# Patient Record
Sex: MALE | Race: OTHER | HISPANIC OR LATINO | URBAN - METROPOLITAN AREA
[De-identification: names, ages, dates, MRNs, and addresses within clinical notes are randomized per-mention and may not be internally consistent; named-entity substitution may affect disease eponyms.]

---

## 2021-10-01 ENCOUNTER — EMERGENCY (EMERGENCY)
Facility: HOSPITAL | Age: 21
LOS: 1 days | Discharge: HOME | End: 2021-10-01
Attending: EMERGENCY MEDICINE
Payer: SELF-PAY

## 2021-10-01 VITALS
TEMPERATURE: 99 F | WEIGHT: 139.99 LBS | HEIGHT: 65 IN | OXYGEN SATURATION: 98 % | HEART RATE: 105 BPM | RESPIRATION RATE: 15 BRPM | SYSTOLIC BLOOD PRESSURE: 153 MMHG | DIASTOLIC BLOOD PRESSURE: 86 MMHG

## 2021-10-01 LAB
APPEARANCE UR: CLEAR — SIGNIFICANT CHANGE UP
BACTERIA # UR AUTO: ABNORMAL /HPF
BILIRUB UR-MCNC: NEGATIVE — SIGNIFICANT CHANGE UP
COLOR SPEC: YELLOW — SIGNIFICANT CHANGE UP
DIFF PNL FLD: ABNORMAL
EPI CELLS # UR: ABNORMAL /HPF
GLUCOSE UR QL: NEGATIVE — SIGNIFICANT CHANGE UP
KETONES UR-MCNC: NEGATIVE — SIGNIFICANT CHANGE UP
LEUKOCYTE ESTERASE UR-ACNC: NEGATIVE — SIGNIFICANT CHANGE UP
NITRITE UR-MCNC: NEGATIVE — SIGNIFICANT CHANGE UP
PH UR: 5 — SIGNIFICANT CHANGE UP (ref 5–8)
PROT UR-MCNC: 15
RBC CASTS # UR COMP ASSIST: ABNORMAL /HPF (ref 0–2)
SP GR SPEC: 1.01 — SIGNIFICANT CHANGE UP (ref 1.01–1.02)
UROBILINOGEN FLD QL: NEGATIVE — SIGNIFICANT CHANGE UP
WBC UR QL: SIGNIFICANT CHANGE UP /HPF (ref 0–5)

## 2021-10-01 PROCEDURE — 96372 THER/PROPH/DIAG INJ SC/IM: CPT

## 2021-10-01 PROCEDURE — 81001 URINALYSIS AUTO W/SCOPE: CPT

## 2021-10-01 PROCEDURE — 87591 N.GONORRHOEAE DNA AMP PROB: CPT

## 2021-10-01 PROCEDURE — 87086 URINE CULTURE/COLONY COUNT: CPT

## 2021-10-01 PROCEDURE — 99283 EMERGENCY DEPT VISIT LOW MDM: CPT | Mod: 25

## 2021-10-01 PROCEDURE — 87491 CHLMYD TRACH DNA AMP PROBE: CPT

## 2021-10-01 PROCEDURE — 99284 EMERGENCY DEPT VISIT MOD MDM: CPT

## 2021-10-01 RX ORDER — AZITHROMYCIN 500 MG/1
1000 TABLET, FILM COATED ORAL ONCE
Refills: 0 | Status: COMPLETED | OUTPATIENT
Start: 2021-10-01 | End: 2021-10-01

## 2021-10-01 RX ORDER — CEFTRIAXONE 500 MG/1
250 INJECTION, POWDER, FOR SOLUTION INTRAMUSCULAR; INTRAVENOUS ONCE
Refills: 0 | Status: COMPLETED | OUTPATIENT
Start: 2021-10-01 | End: 2021-10-01

## 2021-10-01 RX ADMIN — CEFTRIAXONE 250 MILLIGRAM(S): 500 INJECTION, POWDER, FOR SOLUTION INTRAMUSCULAR; INTRAVENOUS at 23:39

## 2021-10-01 RX ADMIN — AZITHROMYCIN 1000 MILLIGRAM(S): 500 TABLET, FILM COATED ORAL at 23:39

## 2021-10-01 NOTE — ED PROVIDER NOTE - PROGRESS NOTE DETAILS
ua with small bacteria. given sti treatment. will dc with cipro given bacteuria and symptoms. f/u PMD. return precautions discussed.

## 2021-10-01 NOTE — ED PROVIDER NOTE - NSFOLLOWUPINSTRUCTIONS_ED_ALL_ED_FT
Log Out.      [employer] CareNotes®     :  [facility]  	                       DYSURIA - AfterCare(R) Instructions(ER/ED)           Disuria    LO QUE NECESITA SABER:    La disuria es la dificultad al orinar, o el dolor, ardor o molestias al orinar. La disuria por lo general es un síntoma de algún otro problema.    INSTRUCCIONES SOBRE EL [facility]ITAL[facility]:    Regrese a la hernandez de emergencias si:  •Usted tiene dolor intenso en la espalda, en un costado o en el abdomen.      •Usted tiene fiebre y escalofríos con temblor.      •Usted vomita varias veces seguidas.      Comuníquese con grigsby médico si:  •Emily síntomas no desaparecen, aún después del tratamiento.      •Usted tiene preguntas o inquietudes acerca de grigsby condición o cuidado.      Medicamentos:  •Los medicamentospara ayudar a tratar andi infección bacteriana o para disminuir los espasmos musculares.      •Pine emily medicamentos ryan se le haya indicado.Consulte con grigsby médico si usted jason que grigsby medicamento no le está ayudando o si presenta efectos secundarios. Infórmele si es alérgico a algún medicamento. Mantenga andi lista actualizada de los medicamentos, las vitaminas y los productos herbales que bobby. Incluya los siguientes datos de los medicamentos: cantidad, frecuencia y motivo de administración. Traiga con usted la lista o los envases de las píldoras a emily citas de seguimiento. Lleve la lista de los medicamentos con usted en fabien de andi emergencia.      Acuda a emily consultas de control con grigsby médico según le indicaron.Grigsby médico podría referirlo a un urólogo o nefrólogo para que le realicen exámenes adicionales. Anote emily preguntas para que se acuerde de hacerlas brigido emily visitas.    Controle grigsby disuria:  •Ingiera más líquidos.Los líquidos ayudan a desechar las bacterias que estén provocando andi infección. Pregunte a grigsby médico sobre la cantidad de líquido que necesita spring todos los renee y cuáles le recomienda.      •Pine tye de asiento ryan se le indique.Llene andi nimco de baño con 4 a 6 pulgadas de agua cálida. También puede usar un recipiente para baño de asiento que quepa en el inodoro. Siéntese en el baño de asiento brigido 20 minutos. Jaylon esto 2 a 3 veces al día o ryan se lo hayan indicado. El agua cálida va a ayudar a reducir el dolor y la inflamación.          © Copyright [employer] 2021           back to top                          © Copyright [employer] 2021

## 2021-10-01 NOTE — ED PROVIDER NOTE - OBJECTIVE STATEMENT
21 year old male denies PMH coming in with dysuria and foul odor of urine for the past day. never had this before. denies testicular pain, penile discharge, abd pains, back pains, flank pains, fevers, chills, sweats, N/V/D/C, lesions on penis. sexually active but no new partners but didn't use condom last time he had sex.

## 2021-10-01 NOTE — ED PROVIDER NOTE - PATIENT PORTAL LINK FT
You can access the FollowMyHealth Patient Portal offered by [facility] by registering at the following website: [email or web address]. By joining [email or web address]’s FollowMyHealth portal, you will also be able to view your health information using other applications (apps) compatible with our system.

## 2021-10-02 VITALS
HEART RATE: 94 BPM | DIASTOLIC BLOOD PRESSURE: 73 MMHG | OXYGEN SATURATION: 98 % | TEMPERATURE: 98 F | SYSTOLIC BLOOD PRESSURE: 149 MMHG | RESPIRATION RATE: 16 BRPM

## 2021-10-02 RX ORDER — CIPROFLOXACIN LACTATE 400MG/40ML
1 VIAL (ML) INTRAVENOUS
Qty: 6 | Refills: 0
Start: 2021-10-02 | End: 2021-10-04

## 2021-10-03 LAB
CULTURE RESULTS: NO GROWTH — SIGNIFICANT CHANGE UP
SPECIMEN SOURCE: SIGNIFICANT CHANGE UP

## 2021-10-04 LAB
C TRACH RRNA SPEC QL NAA+PROBE: SIGNIFICANT CHANGE UP
N GONORRHOEA RRNA SPEC QL NAA+PROBE: SIGNIFICANT CHANGE UP
SPECIMEN SOURCE: SIGNIFICANT CHANGE UP

## 2024-02-20 ENCOUNTER — EMERGENCY (EMERGENCY)
Facility: HOSPITAL | Age: 24
LOS: 1 days | Discharge: HOME | End: 2024-02-20
Attending: STUDENT IN AN ORGANIZED HEALTH CARE EDUCATION/TRAINING PROGRAM
Payer: SELF-PAY

## 2024-02-20 VITALS
HEART RATE: 85 BPM | DIASTOLIC BLOOD PRESSURE: 77 MMHG | OXYGEN SATURATION: 100 % | RESPIRATION RATE: 17 BRPM | SYSTOLIC BLOOD PRESSURE: 131 MMHG

## 2024-02-20 VITALS
OXYGEN SATURATION: 100 % | RESPIRATION RATE: 18 BRPM | TEMPERATURE: 98 F | WEIGHT: 154.32 LBS | SYSTOLIC BLOOD PRESSURE: 176 MMHG | HEART RATE: 104 BPM | DIASTOLIC BLOOD PRESSURE: 68 MMHG

## 2024-02-20 LAB
FLUAV AG NPH QL: SIGNIFICANT CHANGE UP
FLUBV AG NPH QL: SIGNIFICANT CHANGE UP
SARS-COV-2 RNA SPEC QL NAA+PROBE: SIGNIFICANT CHANGE UP

## 2024-02-20 PROCEDURE — 99284 EMERGENCY DEPT VISIT MOD MDM: CPT

## 2024-02-20 PROCEDURE — 99285 EMERGENCY DEPT VISIT HI MDM: CPT | Mod: 25

## 2024-02-20 PROCEDURE — 71046 X-RAY EXAM CHEST 2 VIEWS: CPT | Mod: 26

## 2024-02-20 PROCEDURE — 99053 MED SERV 10PM-8AM 24 HR FAC: CPT

## 2024-02-20 PROCEDURE — 87637 SARSCOV2&INF A&B&RSV AMP PRB: CPT

## 2024-02-20 PROCEDURE — 93005 ELECTROCARDIOGRAM TRACING: CPT

## 2024-02-20 PROCEDURE — 71046 X-RAY EXAM CHEST 2 VIEWS: CPT

## 2024-02-20 RX ORDER — IBUPROFEN 200 MG
600 TABLET ORAL ONCE
Refills: 0 | Status: COMPLETED | OUTPATIENT
Start: 2024-02-20 | End: 2024-02-20

## 2024-02-20 RX ADMIN — Medication 600 MILLIGRAM(S): at 08:10

## 2024-02-20 NOTE — ED PROVIDER NOTE - NSFOLLOWUPINSTRUCTIONS_ED_ALL_ED_FT
Le atendieron en urgencias por dolor en el pecho.    Jaylon un seguimiento con grigsby médico de atención primaria dentro de las próximas 24 a 48 horas.    [place] ibuprofeno y Tylenol según lo prescrito en los frascos para controlar el dolor.    Si tiene algún síntoma que empeora, dolor de christi intenso, dolor en el pecho, dificultad para respirar, regrese al departamento de emergencias.    Translation via Google Translate. Cannot guarantee accuracy.     You were seen in the emergency department for chest pain.     Please follow-up with your primary care doctor within the next 24-48 hours.    Please take Ibuprofen and Tylenol as prescribed on the bottles for pain control.     If you have any worsening symptoms, severe headache, chest pain, trouble breathing, please return to the emergency department.

## 2024-02-20 NOTE — ED ADULT NURSE NOTE - NSFALLUNIVINTERV_ED_ALL_ED
Bed/Stretcher in lowest position, wheels locked, appropriate side rails in place/Call bell, personal items and telephone in reach/Instruct patient to call for assistance before getting out of bed/chair/stretcher/Non-slip footwear applied when patient is off stretcher/[place] to call system/Physically safe environment - no spills, clutter or unnecessary equipment/Purposeful proactive rounding/Room/bathroom lighting operational, light cord in reach

## 2024-02-20 NOTE — ED PROVIDER NOTE - OBJECTIVE STATEMENT
24-year-old male, no significant PMH, presenting with 2 weeks of chest pain.  Patient reports that the chest pain is worse when he exerts himself.  Denies any headache, trouble breathing or fever.  Reports he has had on and off nosebleeds.

## 2024-02-20 NOTE — ED PROVIDER NOTE - PATIENT PORTAL LINK FT
You can access the FollowMyHealth Patient Portal offered by [facility] by registering at the following website: [email or web address]. By joining [employer]’s FollowMyHealth portal, you will also be able to view your health information using other applications (apps) compatible with our system.

## 2024-02-20 NOTE — ED PROVIDER NOTE - CLINICAL SUMMARY MEDICAL DECISION MAKING FREE TEXT BOX
24-year-old male presenting with chest pain.  Pain not exertional or pleuritic but worse when patient moves.  Low concern for ACS.  Symptoms likely secondary to MSK pain versus costochondritis.  Will obtain EKG and chest x-ray to assess for pneumonia.  Patient requesting viral swab. 24-year-old male presenting with chest pain.  Pain not exertional or pleuritic but worse when patient moves.  Low concern for ACS.  Symptoms likely secondary to MSK pain versus costochondritis.  Will obtain EKG and chest x-ray to assess for pneumonia.  Patient requesting viral swab.    patient updated on results. stable for outpatinet followup.
